# Patient Record
Sex: MALE | Race: WHITE | Employment: OTHER | URBAN - METROPOLITAN AREA
[De-identification: names, ages, dates, MRNs, and addresses within clinical notes are randomized per-mention and may not be internally consistent; named-entity substitution may affect disease eponyms.]

---

## 2017-01-01 ENCOUNTER — HOSPITAL ENCOUNTER (OUTPATIENT)
Dept: INTERVENTIONAL RADIOLOGY/VASCULAR | Facility: HOSPITAL | Age: 82
Discharge: HOME | End: 2017-01-01
Attending: UROLOGY | Admitting: UROLOGY
Payer: MEDICARE

## 2017-01-01 ENCOUNTER — OFFICE VISIT (OUTPATIENT)
Dept: WOUND CARE | Facility: HOSPITAL | Age: 82
End: 2017-01-01
Attending: UROLOGY
Payer: MEDICARE

## 2017-01-01 ENCOUNTER — ANESTHESIA EVENT (OUTPATIENT)
Dept: SURGERY | Facility: HOSPITAL | Age: 82
DRG: 654 | End: 2017-01-01
Payer: MEDICARE

## 2017-01-01 ENCOUNTER — APPOINTMENT (OUTPATIENT)
Dept: LAB | Facility: HOSPITAL | Age: 82
End: 2017-01-01
Payer: MEDICARE

## 2017-01-01 ENCOUNTER — APPOINTMENT (OUTPATIENT)
Dept: GENERAL RADIOLOGY | Facility: HOSPITAL | Age: 82
DRG: 654 | End: 2017-01-01
Attending: UROLOGY
Payer: MEDICARE

## 2017-01-01 ENCOUNTER — TELEPHONE (OUTPATIENT)
Dept: WOUND CARE | Facility: HOSPITAL | Age: 82
End: 2017-01-01

## 2017-01-01 ENCOUNTER — ANESTHESIA (OUTPATIENT)
Dept: SURGERY | Facility: HOSPITAL | Age: 82
DRG: 654 | End: 2017-01-01
Payer: MEDICARE

## 2017-01-01 ENCOUNTER — LABORATORY ENCOUNTER (OUTPATIENT)
Dept: LAB | Facility: HOSPITAL | Age: 82
End: 2017-01-01
Payer: MEDICARE

## 2017-01-01 ENCOUNTER — HOSPITAL ENCOUNTER (INPATIENT)
Facility: HOSPITAL | Age: 82
LOS: 4 days | Discharge: HOME HEALTH | DRG: 654 | End: 2017-01-01
Attending: UROLOGY | Admitting: UROLOGY
Payer: MEDICARE

## 2017-01-01 ENCOUNTER — SURGERY (OUTPATIENT)
Age: 82
End: 2017-01-01

## 2017-01-01 ENCOUNTER — APPOINTMENT (OUTPATIENT)
Dept: CT IMAGING | Facility: HOSPITAL | Age: 82
DRG: 654 | End: 2017-01-01
Attending: HOSPITALIST
Payer: MEDICARE

## 2017-01-01 VITALS
DIASTOLIC BLOOD PRESSURE: 47 MMHG | RESPIRATION RATE: 18 BRPM | BODY MASS INDEX: 22.9 KG/M2 | SYSTOLIC BLOOD PRESSURE: 115 MMHG | WEIGHT: 160 LBS | HEIGHT: 70 IN | OXYGEN SATURATION: 99 % | TEMPERATURE: 98 F | HEART RATE: 56 BPM

## 2017-01-01 VITALS
BODY MASS INDEX: 22.94 KG/M2 | SYSTOLIC BLOOD PRESSURE: 127 MMHG | DIASTOLIC BLOOD PRESSURE: 41 MMHG | RESPIRATION RATE: 15 BRPM | HEART RATE: 72 BPM | OXYGEN SATURATION: 96 % | TEMPERATURE: 98 F | WEIGHT: 160.25 LBS | HEIGHT: 70 IN

## 2017-01-01 DIAGNOSIS — R33.9 URINARY RETENTION: ICD-10-CM

## 2017-01-01 DIAGNOSIS — R31.0 GROSS HEMATURIA: ICD-10-CM

## 2017-01-01 DIAGNOSIS — R00.1 BRADYCARDIA, SEVERE SINUS: ICD-10-CM

## 2017-01-01 DIAGNOSIS — C67.8 MALIGNANT NEOPLASM OF OVERLAPPING SITES OF BLADDER (HCC): ICD-10-CM

## 2017-01-01 DIAGNOSIS — E78.5 HYPERLIPIDEMIA, UNSPECIFIED HYPERLIPIDEMIA TYPE: Primary | ICD-10-CM

## 2017-01-01 DIAGNOSIS — N17.9 AKI (ACUTE KIDNEY INJURY) (HCC): ICD-10-CM

## 2017-01-01 DIAGNOSIS — E78.5 HYPERLIPIDEMIA, UNSPECIFIED HYPERLIPIDEMIA TYPE: ICD-10-CM

## 2017-01-01 DIAGNOSIS — N30.40 RADIATION CYSTITIS: ICD-10-CM

## 2017-01-01 DIAGNOSIS — C67.9 MALIGNANT NEOPLASM OF URINARY BLADDER, UNSPECIFIED SITE (HCC): Primary | ICD-10-CM

## 2017-01-01 DIAGNOSIS — N13.30 BILATERAL HYDRONEPHROSIS: ICD-10-CM

## 2017-01-01 LAB
ANTIBODY SCREEN: NEGATIVE
ATRIAL RATE: 56 BPM
BASOPHILS # BLD AUTO: 0.07 X10(3) UL (ref 0–0.1)
BASOPHILS # BLD AUTO: 0.07 X10(3) UL (ref 0–0.1)
BASOPHILS NFR BLD AUTO: 0.5 %
BASOPHILS NFR BLD AUTO: 0.6 %
BLOOD TYPE BARCODE: 7300
BUN BLD-MCNC: 19 MG/DL (ref 8–20)
BUN BLD-MCNC: 21 MG/DL (ref 8–20)
BUN BLD-MCNC: 23 MG/DL (ref 8–20)
BUN BLD-MCNC: 23 MG/DL (ref 8–20)
BUN BLD-MCNC: 24 MG/DL (ref 8–20)
CALCIUM BLD-MCNC: 8.4 MG/DL (ref 8.3–10.3)
CALCIUM BLD-MCNC: 8.5 MG/DL (ref 8.3–10.3)
CALCIUM BLD-MCNC: 8.5 MG/DL (ref 8.3–10.3)
CALCIUM BLD-MCNC: 9.3 MG/DL (ref 8.3–10.3)
CALCIUM BLD-MCNC: 9.4 MG/DL (ref 8.3–10.3)
CHLORIDE: 101 MMOL/L (ref 101–111)
CHLORIDE: 103 MMOL/L (ref 101–111)
CHLORIDE: 104 MMOL/L (ref 101–111)
CHLORIDE: 111 MMOL/L (ref 101–111)
CHLORIDE: 111 MMOL/L (ref 101–111)
CO2: 19 MMOL/L (ref 22–32)
CO2: 22 MMOL/L (ref 22–32)
CO2: 25 MMOL/L (ref 22–32)
CO2: 25 MMOL/L (ref 22–32)
CO2: 27 MMOL/L (ref 22–32)
CREAT BLD-MCNC: 1.06 MG/DL (ref 0.7–1.3)
CREAT BLD-MCNC: 1.14 MG/DL (ref 0.7–1.3)
CREAT BLD-MCNC: 1.2 MG/DL (ref 0.7–1.3)
CREAT BLD-MCNC: 1.41 MG/DL (ref 0.7–1.3)
CREAT BLD-MCNC: 1.49 MG/DL (ref 0.7–1.3)
CREATININE, OTHER BODY FLUID: 0.95 MG/DL
CREATININE, OTHER BODY FLUID: 1.03 MG/DL
DEPRECATED HBV CORE AB SER IA-ACNC: 242.8 NG/ML (ref 22–322)
DEPRECATED HBV CORE AB SER IA-ACNC: 345.4 NG/ML (ref 22–322)
EOSINOPHIL # BLD AUTO: 0.37 X10(3) UL (ref 0–0.3)
EOSINOPHIL # BLD AUTO: 0.57 X10(3) UL (ref 0–0.3)
EOSINOPHIL NFR BLD AUTO: 3.2 %
EOSINOPHIL NFR BLD AUTO: 3.9 %
ERYTHROCYTE [DISTWIDTH] IN BLOOD BY AUTOMATED COUNT: 12.1 % (ref 11.5–16)
ERYTHROCYTE [DISTWIDTH] IN BLOOD BY AUTOMATED COUNT: 12.4 % (ref 11.5–16)
ERYTHROCYTE [DISTWIDTH] IN BLOOD BY AUTOMATED COUNT: 12.7 % (ref 11.5–16)
ERYTHROCYTE [DISTWIDTH] IN BLOOD BY AUTOMATED COUNT: 12.8 % (ref 11.5–16)
ERYTHROCYTE [DISTWIDTH] IN BLOOD BY AUTOMATED COUNT: 12.8 % (ref 11.5–16)
ERYTHROCYTE [DISTWIDTH] IN BLOOD BY AUTOMATED COUNT: 13 % (ref 11.5–16)
ERYTHROCYTE [DISTWIDTH] IN BLOOD BY AUTOMATED COUNT: 13 % (ref 11.5–16)
GLUCOSE BLD-MCNC: 127 MG/DL (ref 70–99)
GLUCOSE BLD-MCNC: 132 MG/DL (ref 70–99)
GLUCOSE BLD-MCNC: 169 MG/DL (ref 70–99)
GLUCOSE BLD-MCNC: 97 MG/DL (ref 70–99)
GLUCOSE BLD-MCNC: 98 MG/DL (ref 70–99)
HCT VFR BLD AUTO: 31 % (ref 37–53)
HCT VFR BLD AUTO: 31.1 % (ref 37–53)
HCT VFR BLD AUTO: 32 % (ref 37–53)
HCT VFR BLD AUTO: 33.1 % (ref 37–53)
HCT VFR BLD AUTO: 33.1 % (ref 37–53)
HCT VFR BLD AUTO: 35.2 % (ref 37–53)
HCT VFR BLD AUTO: 36.8 % (ref 37–53)
HGB BLD-MCNC: 10.2 G/DL (ref 13–17)
HGB BLD-MCNC: 10.2 G/DL (ref 13–17)
HGB BLD-MCNC: 10.7 G/DL (ref 13–17)
HGB BLD-MCNC: 11.5 G/DL (ref 13–17)
HGB BLD-MCNC: 11.9 G/DL (ref 13–17)
IMMATURE GRANULOCYTE COUNT: 0.07 X10(3) UL (ref 0–1)
IMMATURE GRANULOCYTE COUNT: 0.13 X10(3) UL (ref 0–1)
IMMATURE GRANULOCYTE RATIO %: 0.6 %
IMMATURE GRANULOCYTE RATIO %: 0.9 %
IRON SATURATION: 5 % (ref 13–45)
IRON SATURATION: 8 % (ref 13–45)
IRON: 10 UG/DL (ref 45–182)
IRON: 13 UG/DL (ref 45–182)
LYMPHOCYTES # BLD AUTO: 0.89 X10(3) UL (ref 0.9–4)
LYMPHOCYTES # BLD AUTO: 1.74 X10(3) UL (ref 0.9–4)
LYMPHOCYTES NFR BLD AUTO: 15.2 %
LYMPHOCYTES NFR BLD AUTO: 6.2 %
MCH RBC QN AUTO: 29.2 PG (ref 27–33.2)
MCH RBC QN AUTO: 29.2 PG (ref 27–33.2)
MCH RBC QN AUTO: 29.3 PG (ref 27–33.2)
MCH RBC QN AUTO: 29.4 PG (ref 27–33.2)
MCH RBC QN AUTO: 29.5 PG (ref 27–33.2)
MCH RBC QN AUTO: 29.5 PG (ref 27–33.2)
MCH RBC QN AUTO: 29.7 PG (ref 27–33.2)
MCHC RBC AUTO-ENTMCNC: 32.3 G/DL (ref 31–37)
MCHC RBC AUTO-ENTMCNC: 32.7 G/DL (ref 31–37)
MCHC RBC AUTO-ENTMCNC: 32.8 G/DL (ref 31–37)
MCHC RBC AUTO-ENTMCNC: 32.9 G/DL (ref 31–37)
MCHC RBC AUTO-ENTMCNC: 33.4 G/DL (ref 31–37)
MCV RBC AUTO: 88.9 FL (ref 80–99)
MCV RBC AUTO: 89.6 FL (ref 80–99)
MCV RBC AUTO: 90.2 FL (ref 80–99)
MCV RBC AUTO: 90.2 FL (ref 80–99)
MCV RBC AUTO: 91.3 FL (ref 80–99)
MONOCYTES # BLD AUTO: 0.54 X10(3) UL (ref 0.1–0.6)
MONOCYTES # BLD AUTO: 0.81 X10(3) UL (ref 0.1–0.6)
MONOCYTES NFR BLD AUTO: 3.7 %
MONOCYTES NFR BLD AUTO: 7.1 %
NEUTROPHIL ABS PRELIM: 12.27 X10 (3) UL (ref 1.3–6.7)
NEUTROPHIL ABS PRELIM: 8.35 X10 (3) UL (ref 1.3–6.7)
NEUTROPHILS # BLD AUTO: 12.27 X10(3) UL (ref 1.3–6.7)
NEUTROPHILS # BLD AUTO: 8.35 X10(3) UL (ref 1.3–6.7)
NEUTROPHILS NFR BLD AUTO: 73.3 %
NEUTROPHILS NFR BLD AUTO: 84.8 %
P AXIS: 59 DEGREES
P-R INTERVAL: 204 MS
PLATELET # BLD AUTO: 256 10(3)UL (ref 150–450)
PLATELET # BLD AUTO: 263 10(3)UL (ref 150–450)
PLATELET # BLD AUTO: 287 10(3)UL (ref 150–450)
PLATELET # BLD AUTO: 290 10(3)UL (ref 150–450)
PLATELET # BLD AUTO: 290 10(3)UL (ref 150–450)
PLATELET # BLD AUTO: 313 10(3)UL (ref 150–450)
PLATELET # BLD AUTO: 322 10(3)UL (ref 150–450)
POTASSIUM SERPL-SCNC: 4.2 MMOL/L (ref 3.6–5.1)
POTASSIUM SERPL-SCNC: 4.5 MMOL/L (ref 3.6–5.1)
POTASSIUM SERPL-SCNC: 4.6 MMOL/L (ref 3.6–5.1)
POTASSIUM SERPL-SCNC: 4.6 MMOL/L (ref 3.6–5.1)
POTASSIUM SERPL-SCNC: 4.9 MMOL/L (ref 3.6–5.1)
Q-T INTERVAL: 406 MS
QRS DURATION: 96 MS
QTC CALCULATION (BEZET): 391 MS
R AXIS: -33 DEGREES
RBC # BLD AUTO: 3.46 X10(6)UL (ref 3.8–5.8)
RBC # BLD AUTO: 3.47 X10(6)UL (ref 3.8–5.8)
RBC # BLD AUTO: 3.6 X10(6)UL (ref 3.8–5.8)
RBC # BLD AUTO: 3.67 X10(6)UL (ref 3.8–5.8)
RBC # BLD AUTO: 3.67 X10(6)UL (ref 3.8–5.8)
RBC # BLD AUTO: 3.93 X10(6)UL (ref 3.8–5.8)
RBC # BLD AUTO: 4.03 X10(6)UL (ref 3.8–5.8)
RED CELL DISTRIBUTION WIDTH-SD: 39.7 FL (ref 35.1–46.3)
RED CELL DISTRIBUTION WIDTH-SD: 41.1 FL (ref 35.1–46.3)
RED CELL DISTRIBUTION WIDTH-SD: 41.3 FL (ref 35.1–46.3)
RED CELL DISTRIBUTION WIDTH-SD: 42.2 FL (ref 35.1–46.3)
RED CELL DISTRIBUTION WIDTH-SD: 42.2 FL (ref 35.1–46.3)
RED CELL DISTRIBUTION WIDTH-SD: 42.4 FL (ref 35.1–46.3)
RED CELL DISTRIBUTION WIDTH-SD: 42.5 FL (ref 35.1–46.3)
RH BLOOD TYPE: POSITIVE
SODIUM SERPL-SCNC: 135 MMOL/L (ref 136–144)
SODIUM SERPL-SCNC: 138 MMOL/L (ref 136–144)
SODIUM SERPL-SCNC: 138 MMOL/L (ref 136–144)
SODIUM SERPL-SCNC: 139 MMOL/L (ref 136–144)
SODIUM SERPL-SCNC: 140 MMOL/L (ref 136–144)
T AXIS: 46 DEGREES
TOTAL IRON BINDING CAPACITY: 162 UG/DL (ref 298–536)
TOTAL IRON BINDING CAPACITY: 212 UG/DL (ref 298–536)
TRANSFERRIN: 109 MG/DL (ref 200–360)
TRANSFERRIN: 142 MG/DL (ref 200–360)
VENTRICULAR RATE: 56 BPM
WBC # BLD AUTO: 11.4 X10(3) UL (ref 4–13)
WBC # BLD AUTO: 12.5 X10(3) UL (ref 4–13)
WBC # BLD AUTO: 13.1 X10(3) UL (ref 4–13)
WBC # BLD AUTO: 14.5 X10(3) UL (ref 4–13)
WBC # BLD AUTO: 14.5 X10(3) UL (ref 4–13)
WBC # BLD AUTO: 16.3 X10(3) UL (ref 4–13)
WBC # BLD AUTO: 18.1 X10(3) UL (ref 4–13)

## 2017-01-01 PROCEDURE — 97530 THERAPEUTIC ACTIVITIES: CPT

## 2017-01-01 PROCEDURE — 97161 PT EVAL LOW COMPLEX 20 MIN: CPT

## 2017-01-01 PROCEDURE — 85025 COMPLETE CBC W/AUTO DIFF WBC: CPT

## 2017-01-01 PROCEDURE — 80048 BASIC METABOLIC PNL TOTAL CA: CPT | Performed by: UROLOGY

## 2017-01-01 PROCEDURE — 87086 URINE CULTURE/COLONY COUNT: CPT | Performed by: EMERGENCY MEDICINE

## 2017-01-01 PROCEDURE — 82728 ASSAY OF FERRITIN: CPT | Performed by: UROLOGY

## 2017-01-01 PROCEDURE — 99215 OFFICE O/P EST HI 40 MIN: CPT

## 2017-01-01 PROCEDURE — 0T184ZC BYPASS BILATERAL URETERS TO ILEOCUTANEOUS, PERCUTANEOUS ENDOSCOPIC APPROACH: ICD-10-PCS | Performed by: UROLOGY

## 2017-01-01 PROCEDURE — 87086 URINE CULTURE/COLONY COUNT: CPT | Performed by: UROLOGY

## 2017-01-01 PROCEDURE — 99213 OFFICE O/P EST LOW 20 MIN: CPT

## 2017-01-01 PROCEDURE — 82728 ASSAY OF FERRITIN: CPT | Performed by: HOSPITALIST

## 2017-01-01 PROCEDURE — 0T9130Z DRAINAGE OF LEFT KIDNEY WITH DRAINAGE DEVICE, PERCUTANEOUS APPROACH: ICD-10-PCS | Performed by: RADIOLOGY

## 2017-01-01 PROCEDURE — 88309 TISSUE EXAM BY PATHOLOGIST: CPT | Performed by: UROLOGY

## 2017-01-01 PROCEDURE — 88307 TISSUE EXAM BY PATHOLOGIST: CPT | Performed by: UROLOGY

## 2017-01-01 PROCEDURE — 86850 RBC ANTIBODY SCREEN: CPT | Performed by: UROLOGY

## 2017-01-01 PROCEDURE — 85027 COMPLETE CBC AUTOMATED: CPT | Performed by: UROLOGY

## 2017-01-01 PROCEDURE — 87077 CULTURE AEROBIC IDENTIFY: CPT | Performed by: UROLOGY

## 2017-01-01 PROCEDURE — 88305 TISSUE EXAM BY PATHOLOGIST: CPT | Performed by: UROLOGY

## 2017-01-01 PROCEDURE — 87186 SC STD MICRODIL/AGAR DIL: CPT | Performed by: UROLOGY

## 2017-01-01 PROCEDURE — 87186 SC STD MICRODIL/AGAR DIL: CPT | Performed by: EMERGENCY MEDICINE

## 2017-01-01 PROCEDURE — 86901 BLOOD TYPING SEROLOGIC RH(D): CPT | Performed by: UROLOGY

## 2017-01-01 PROCEDURE — 83540 ASSAY OF IRON: CPT | Performed by: HOSPITALIST

## 2017-01-01 PROCEDURE — 8E0W4CZ ROBOTIC ASSISTED PROCEDURE OF TRUNK REGION, PERCUTANEOUS ENDOSCOPIC APPROACH: ICD-10-PCS | Performed by: UROLOGY

## 2017-01-01 PROCEDURE — 87077 CULTURE AEROBIC IDENTIFY: CPT | Performed by: EMERGENCY MEDICINE

## 2017-01-01 PROCEDURE — 88342 IMHCHEM/IMCYTCHM 1ST ANTB: CPT | Performed by: UROLOGY

## 2017-01-01 PROCEDURE — 93005 ELECTROCARDIOGRAM TRACING: CPT

## 2017-01-01 PROCEDURE — 07BC4ZZ EXCISION OF PELVIS LYMPHATIC, PERCUTANEOUS ENDOSCOPIC APPROACH: ICD-10-PCS | Performed by: UROLOGY

## 2017-01-01 PROCEDURE — 74000 XR ABDOMEN (1 VIEW) (CPT=74000): CPT | Performed by: UROLOGY

## 2017-01-01 PROCEDURE — 36415 COLL VENOUS BLD VENIPUNCTURE: CPT

## 2017-01-01 PROCEDURE — 50432 PLMT NEPHROSTOMY CATHETER: CPT

## 2017-01-01 PROCEDURE — 80048 BASIC METABOLIC PNL TOTAL CA: CPT

## 2017-01-01 PROCEDURE — 0TTB4ZZ RESECTION OF BLADDER, PERCUTANEOUS ENDOSCOPIC APPROACH: ICD-10-PCS | Performed by: UROLOGY

## 2017-01-01 PROCEDURE — 82570 ASSAY OF URINE CREATININE: CPT | Performed by: UROLOGY

## 2017-01-01 PROCEDURE — 83550 IRON BINDING TEST: CPT | Performed by: HOSPITALIST

## 2017-01-01 PROCEDURE — 0VB04ZZ EXCISION OF PROSTATE, PERCUTANEOUS ENDOSCOPIC APPROACH: ICD-10-PCS | Performed by: UROLOGY

## 2017-01-01 PROCEDURE — 83550 IRON BINDING TEST: CPT | Performed by: UROLOGY

## 2017-01-01 PROCEDURE — 99152 MOD SED SAME PHYS/QHP 5/>YRS: CPT

## 2017-01-01 PROCEDURE — 97116 GAIT TRAINING THERAPY: CPT

## 2017-01-01 PROCEDURE — 81001 URINALYSIS AUTO W/SCOPE: CPT | Performed by: UROLOGY

## 2017-01-01 PROCEDURE — 83540 ASSAY OF IRON: CPT | Performed by: UROLOGY

## 2017-01-01 PROCEDURE — 88341 IMHCHEM/IMCYTCHM EA ADD ANTB: CPT | Performed by: UROLOGY

## 2017-01-01 PROCEDURE — 93010 ELECTROCARDIOGRAM REPORT: CPT | Performed by: INTERNAL MEDICINE

## 2017-01-01 PROCEDURE — 86900 BLOOD TYPING SEROLOGIC ABO: CPT | Performed by: UROLOGY

## 2017-01-01 PROCEDURE — 85025 COMPLETE CBC W/AUTO DIFF WBC: CPT | Performed by: UROLOGY

## 2017-01-01 PROCEDURE — 70450 CT HEAD/BRAIN W/O DYE: CPT | Performed by: HOSPITALIST

## 2017-01-01 PROCEDURE — 0T9030Z DRAINAGE OF RIGHT KIDNEY WITH DRAINAGE DEVICE, PERCUTANEOUS APPROACH: ICD-10-PCS | Performed by: RADIOLOGY

## 2017-01-01 PROCEDURE — 86920 COMPATIBILITY TEST SPIN: CPT

## 2017-01-01 DEVICE — URINARY DIVERSN STENT: Type: IMPLANTABLE DEVICE | Site: URETER | Status: FUNCTIONAL

## 2017-01-01 RX ORDER — LIDOCAINE HYDROCHLORIDE 10 MG/ML
INJECTION, SOLUTION INFILTRATION; PERINEURAL
Status: COMPLETED
Start: 2017-01-01 | End: 2017-01-01

## 2017-01-01 RX ORDER — MULTIVITAMIN WITH FOLIC ACID 400 MCG
1 TABLET ORAL DAILY
COMMUNITY
End: 2017-01-01

## 2017-01-01 RX ORDER — FAMOTIDINE 20 MG/1
20 TABLET ORAL DAILY
Status: DISCONTINUED | OUTPATIENT
Start: 2017-01-01 | End: 2017-01-01

## 2017-01-01 RX ORDER — ATORVASTATIN CALCIUM 20 MG/1
20 TABLET, FILM COATED ORAL NIGHTLY
Status: DISCONTINUED | OUTPATIENT
Start: 2017-01-01 | End: 2017-01-01

## 2017-01-01 RX ORDER — CEFOXITIN 2 G/1
2 INJECTION, POWDER, FOR SOLUTION INTRAVENOUS ONCE
Status: DISCONTINUED | OUTPATIENT
Start: 2017-01-01 | End: 2017-01-01 | Stop reason: HOSPADM

## 2017-01-01 RX ORDER — SODIUM CHLORIDE, SODIUM LACTATE, POTASSIUM CHLORIDE, CALCIUM CHLORIDE 600; 310; 30; 20 MG/100ML; MG/100ML; MG/100ML; MG/100ML
INJECTION, SOLUTION INTRAVENOUS CONTINUOUS
Status: DISCONTINUED | OUTPATIENT
Start: 2017-01-01 | End: 2017-01-01

## 2017-01-01 RX ORDER — NALOXONE HYDROCHLORIDE 0.4 MG/ML
80 INJECTION, SOLUTION INTRAMUSCULAR; INTRAVENOUS; SUBCUTANEOUS AS NEEDED
Status: DISCONTINUED | OUTPATIENT
Start: 2017-01-01 | End: 2017-01-01 | Stop reason: HOSPADM

## 2017-01-01 RX ORDER — ACETAMINOPHEN 500 MG
500 TABLET ORAL EVERY 6 HOURS PRN
COMMUNITY
End: 2017-01-01 | Stop reason: ALTCHOICE

## 2017-01-01 RX ORDER — BISACODYL 10 MG
10 SUPPOSITORY, RECTAL RECTAL
Status: DISCONTINUED | OUTPATIENT
Start: 2017-01-01 | End: 2017-01-01

## 2017-01-01 RX ORDER — ACETAMINOPHEN 500 MG
1000 TABLET ORAL EVERY 6 HOURS
Status: DISPENSED | OUTPATIENT
Start: 2017-01-01 | End: 2017-01-01

## 2017-01-01 RX ORDER — LEVOFLOXACIN 5 MG/ML
INJECTION, SOLUTION INTRAVENOUS
Status: COMPLETED
Start: 2017-01-01 | End: 2017-01-01

## 2017-01-01 RX ORDER — FAMOTIDINE 20 MG/1
20 TABLET ORAL 2 TIMES DAILY
Status: DISCONTINUED | OUTPATIENT
Start: 2017-01-01 | End: 2017-01-01

## 2017-01-01 RX ORDER — HYDROCODONE BITARTRATE AND ACETAMINOPHEN 5; 325 MG/1; MG/1
2 TABLET ORAL AS NEEDED
Status: DISCONTINUED | OUTPATIENT
Start: 2017-01-01 | End: 2017-01-01 | Stop reason: HOSPADM

## 2017-01-01 RX ORDER — ENOXAPARIN SODIUM 100 MG/ML
40 INJECTION SUBCUTANEOUS NIGHTLY
Status: DISCONTINUED | OUTPATIENT
Start: 2017-01-01 | End: 2017-01-01

## 2017-01-01 RX ORDER — TRAMADOL HYDROCHLORIDE 50 MG/1
50 TABLET ORAL EVERY 6 HOURS PRN
Status: DISCONTINUED | OUTPATIENT
Start: 2017-01-01 | End: 2017-01-01

## 2017-01-01 RX ORDER — ENOXAPARIN SODIUM 100 MG/ML
40 INJECTION SUBCUTANEOUS DAILY
Qty: 5.6 ML | Refills: 0 | Status: SHIPPED | COMMUNITY
Start: 2017-01-01 | End: 2017-01-01

## 2017-01-01 RX ORDER — HYDROCODONE BITARTRATE AND ACETAMINOPHEN 5; 325 MG/1; MG/1
1 TABLET ORAL AS NEEDED
Status: DISCONTINUED | OUTPATIENT
Start: 2017-01-01 | End: 2017-01-01 | Stop reason: HOSPADM

## 2017-01-01 RX ORDER — ONDANSETRON 2 MG/ML
4 INJECTION INTRAMUSCULAR; INTRAVENOUS EVERY 4 HOURS PRN
Status: DISCONTINUED | OUTPATIENT
Start: 2017-01-01 | End: 2017-01-01

## 2017-01-01 RX ORDER — CEFOXITIN 2 G/1
INJECTION, POWDER, FOR SOLUTION INTRAVENOUS
Status: DISCONTINUED | OUTPATIENT
Start: 2017-01-01 | End: 2017-01-01 | Stop reason: HOSPADM

## 2017-01-01 RX ORDER — HYDROMORPHONE HYDROCHLORIDE 1 MG/ML
0.2 INJECTION, SOLUTION INTRAMUSCULAR; INTRAVENOUS; SUBCUTANEOUS EVERY 2 HOUR PRN
Status: DISCONTINUED | OUTPATIENT
Start: 2017-01-01 | End: 2017-01-01

## 2017-01-01 RX ORDER — TRAMADOL HYDROCHLORIDE 50 MG/1
50 TABLET ORAL EVERY 6 HOURS PRN
Qty: 20 TABLET | Refills: 0 | Status: SHIPPED | OUTPATIENT
Start: 2017-01-01 | End: 2017-01-01

## 2017-01-01 RX ORDER — SODIUM CHLORIDE 9 MG/ML
INJECTION, SOLUTION INTRAVENOUS CONTINUOUS
Status: DISCONTINUED | OUTPATIENT
Start: 2017-01-01 | End: 2017-01-01

## 2017-01-01 RX ORDER — MIDAZOLAM HYDROCHLORIDE 1 MG/ML
INJECTION INTRAMUSCULAR; INTRAVENOUS
Status: COMPLETED
Start: 2017-01-01 | End: 2017-01-01

## 2017-01-01 RX ORDER — HYDROMORPHONE HYDROCHLORIDE 1 MG/ML
0.4 INJECTION, SOLUTION INTRAMUSCULAR; INTRAVENOUS; SUBCUTANEOUS EVERY 5 MIN PRN
Status: DISCONTINUED | OUTPATIENT
Start: 2017-01-01 | End: 2017-01-01 | Stop reason: HOSPADM

## 2017-01-01 RX ORDER — HALOPERIDOL 5 MG/ML
1 INJECTION INTRAMUSCULAR EVERY 6 HOURS PRN
Status: DISCONTINUED | OUTPATIENT
Start: 2017-01-01 | End: 2017-01-01

## 2017-01-01 RX ORDER — PSEUDOEPHEDRINE HCL 30 MG
100 TABLET ORAL 2 TIMES DAILY PRN
Qty: 30 CAPSULE | Refills: 0 | Status: SHIPPED | OUTPATIENT
Start: 2017-01-01 | End: 2017-01-01

## 2017-01-01 RX ORDER — BUPIVACAINE HYDROCHLORIDE 5 MG/ML
INJECTION, SOLUTION PERINEURAL AS NEEDED
Status: DISCONTINUED | OUTPATIENT
Start: 2017-01-01 | End: 2017-01-01 | Stop reason: HOSPADM

## 2017-01-01 RX ORDER — DOCUSATE SODIUM 100 MG/1
100 CAPSULE, LIQUID FILLED ORAL 2 TIMES DAILY
Status: DISCONTINUED | OUTPATIENT
Start: 2017-01-01 | End: 2017-01-01

## 2017-01-01 RX ORDER — MEPERIDINE HYDROCHLORIDE 25 MG/ML
12.5 INJECTION INTRAMUSCULAR; INTRAVENOUS; SUBCUTANEOUS AS NEEDED
Status: DISCONTINUED | OUTPATIENT
Start: 2017-01-01 | End: 2017-01-01 | Stop reason: HOSPADM

## 2017-01-01 RX ORDER — HEPARIN SODIUM 5000 [USP'U]/ML
5000 INJECTION, SOLUTION INTRAVENOUS; SUBCUTANEOUS ONCE
Status: COMPLETED | OUTPATIENT
Start: 2017-01-01 | End: 2017-01-01

## 2017-01-01 RX ADMIN — SODIUM CHLORIDE: 9 INJECTION, SOLUTION INTRAVENOUS at 07:15:00

## 2017-02-22 PROBLEM — Z85.828 HISTORY OF SCC (SQUAMOUS CELL CARCINOMA) OF SKIN: Status: ACTIVE | Noted: 2017-01-01

## 2017-02-22 PROBLEM — C61 PROSTATE CANCER (HCC): Status: ACTIVE | Noted: 2017-01-01

## 2017-02-22 PROBLEM — R91.8 PULMONARY NODULES: Status: ACTIVE | Noted: 2017-01-01

## 2017-02-22 PROBLEM — M47.816 ARTHRITIS, LUMBAR SPINE: Status: ACTIVE | Noted: 2017-01-01

## 2017-04-26 PROBLEM — N13.9 OBSTRUCTION, UROPATHY: Status: ACTIVE | Noted: 2017-01-01

## 2017-04-26 PROBLEM — Z87.448 HISTORY OF ACUTE RENAL FAILURE: Status: ACTIVE | Noted: 2017-01-01

## 2017-05-09 PROBLEM — C67.9 MALIGNANT NEOPLASM OF URINARY BLADDER, UNSPECIFIED SITE (HCC): Status: ACTIVE | Noted: 2017-01-01

## 2017-05-25 PROBLEM — N18.30 STAGE 3 CHRONIC KIDNEY DISEASE (HCC): Status: ACTIVE | Noted: 2017-01-01

## 2017-07-25 PROBLEM — N30.40 RADIATION CYSTITIS: Status: ACTIVE | Noted: 2017-01-01

## 2017-07-28 NOTE — PROGRESS NOTES
Rc'd pt from IR in stable condition. Bilateral neph tubes intact. Right draining clear yellow urine, left draining bloody urine. Pt denies c/o pain or discomfort. After 2hrs bedrest, reviewed all instructions, pt and family verbalized understanding.  All qu

## 2017-07-28 NOTE — PROCEDURES
[facility]  Procedure Note    Wanda Rojo.  Patient Status:  Outpatient in a Bed    1929 MRN [id number]   Location [address] [facility] Attending Akin Fischer MD   Hosp Day # 0 PCP Susi Kraus MD     Procedure: Talha Davis

## 2017-07-28 NOTE — H&P
[address]  Patient Status:  Outpatient in a Bed    1929 MRN [id number]   Location [address] [facility] Attending Luly Sousa MD   Hosp Day # 0 PCP Magui Keating MD     Admitting D SURGICAL HISTORY      Comment: Cysto, TURBT- Dr. Sallie Puente  11/12/15: OTHER SURGICAL HISTORY      Comment: Cysto, Bladder Bx- Dr. Sallie Puente   4/19/16: OTHER SURGICAL HISTORY      Comment: BTS Cystoscopy- Dr. Sallie Puente   8/2/16: OTHER SURGICAL HISTORY      Commen respirations  Neuro: AOx3    Results:   Labs:  Recent Labs   Lab  07/25/17   1407   RBC  3.98   HGB  11.6*   HCT  36.1*   MCV  90.7   MCH  29.1   MCHC  32.1   RDW  12.7   WBC  13.23*   PLT  285     Recent Labs   Lab  07/25/17   1407   INR  1.0   PTT  32.9

## 2017-08-11 NOTE — PROGRESS NOTES
[facility]  Report of Pre-op Ostomy Nurse Consultation    Zachary Whyte.  Patient Status:  Wound Series    1929 MRN [id number]   Location [address] [place] [address] Attending Jerri Vega MD     History of Present Illness:  THE [facility] HISTORY      Comment: seeds    3/17/15: OTHER SURGICAL HISTORY      Comment: BTS Cysto- Dr. Francine Rouse   3/23/15: OTHER SURGICAL HISTORY      Comment: Cysto, TURBT- Dr. Francine Rouse  11/12/15: OTHER SURGICAL HISTORY      Comment: Cysto, Bladder Bx- Dr. Francine Rouse   4 order, pt was instructed that these markings are guidelines but surgeon will choose final location during surgery. Abdomen examined with patient laying, sitting and standing. Stoma sites marked to RUQ and RLQ    These sites were in 1. Rectus muscle, 2.  In

## 2017-08-14 NOTE — PROGRESS NOTES
NURSING ADMISSION NOTE      Patient admitted via Cart  Oriented to room. Safety precautions initiated. Bed in low position. Call light in reach. A/o x 4. Drowsy but awakens easily to voice.   Reports mild pain, family was concerned he was grimacing

## 2017-08-14 NOTE — PROGRESS NOTES
Bandage on right lower back. Dry and intact. Ilio conduit drainage small amount of pink fluid. Abdomen flat with a small puffy area medially to drain site. Penrose drain intact draining small amount red fluid.

## 2017-08-14 NOTE — H&P
H&P reviewed by Dr. Vilma Barraza  The above referenced H&P was reviewed by Shelton Petersen MD on 8/14/2017, the patient was examined and no significant changes have occurred in the patient's condition since the H&P was performed.   Risks and benefits were discussed,

## 2017-08-14 NOTE — ANESTHESIA PREPROCEDURE EVALUATION
PRE-OP EVALUATION      Patient Name: Briseida Cruz.    Pre-op Diagnosis: Retention of urine, unspecified [R33.9]     Procedure(s):  CYSTOSCOPY    Surgeon(s) and Role:     Khadijah Jacobo MD - Primary    Pre-op vitals reviewed.        Current medic 4/19/16: OTHER SURGICAL HISTORY      Comment: BTS Cystoscopy- Dr. Luana Chaudhary   8/2/16: OTHER SURGICAL HISTORY      Comment: BTS Cystoscopy- Dr. Luana Chaudhary   12/6/16: OTHER SURGICAL HISTORY      Comment: BTS Cystoscopy- Dr. Luana Chaudhary   3/21/17: OTHER SURGICAL HIS 27.0 07/31/2017   BUN 21 (H) 07/31/2017   BUN 21 (H) 07/25/2017   CREATSERUM 1.41 (H) 07/31/2017   CREATSERUM 1.69 (H) 07/25/2017   GLU 97 07/31/2017   CA 9.3 07/31/2017       Lab Results  Component Value Date   INR 1.0 07/25/2017           Airway      Mal

## 2017-08-14 NOTE — OPERATIVE REPORT
Operative Note    Patient Name: Maya Dang.     Preoperative Diagnosis: BLADDER CANCER    Postoperative Diagnosis: same    Primary Surgeon: Nayeli Lennon    Assistant: Antonette Morales CSA    Procedures: Cystoscopy, Robotic Radical Cystoprostatectomy,

## 2017-08-15 NOTE — PLAN OF CARE
Minimal or absence of nausea and vomiting Progressing      Maintains or returns to baseline bowel function Progressing      Absence of urinary retention Progressing      Verbalizes/displays adequate comfort level or patient's stated pain goal Progressing

## 2017-08-15 NOTE — PAYOR COMM NOTE
--------------  ADMISSION REVIEW     Payor: BCBS MEDICARE ADV PPO  Subscriber #:  [id number]  Authorization Number: [id number]    Admit date: N/A  Admit time: N/A       Admitting Physician: Cliff Hare MD  Attending Physician:  Cliff Hare MD  Primary 8/14/2017 1607 Given 0.2 mg Intravenous Susi Khanna RN      0.9%  NaCl infusion     Date Action Dose Route User    8/15/2017 0800 New Bag (none) Intravenous Jaylon Montes RN    8/14/2017 2114 New Bag (none) Intravenous Rosanna Ng RN      s

## 2017-08-15 NOTE — HOME CARE LIAISON
[facility] NOT ABLE TO ACCEPT [facility].  NOT CONTRACTED WITH [facility] INSURANCE    THANKS  Aileen Maldonado

## 2017-08-15 NOTE — PHYSICAL THERAPY NOTE
PHYSICAL THERAPY EVALUATION - INPATIENT     Room Number: 268/981-N  Evaluation Date: 8/15/2017  Type of Evaluation: Initial  Physician Order: PT Eval and Treat    Presenting Problem: Bladder CA s/p procedure 8/14/17 and HL  Reason for Therapy: Mobility COLONOSCOPY      Comment: NoRMAL  2015: COLONOSCOPY  3/23/2015: CYSTOURETHROSCOPY,FULGUR 2-5CM DANIE N/A      Comment: Procedure: CYSTOSCOPY TRANSURETHRAL RESECTION                OF BLADDER TUMOR;  Surgeon: Dereje Escalante MD;  Location: [facility]; pt amb sans AD    SUBJECTIVE  \"Sometimes I see things\"    Patient self-stated goal is get stronger and go home    OBJECTIVE  Precautions: Drain(s)  Fall Risk: Standard fall risk    WEIGHT BEARING RESTRICTION  Weight Bearing Restriction: None 20.91%   Standardized Score (AM-PAC Scale): 53.28   CMS Modifier (G-Code): CJ    FUNCTIONAL ABILITY STATUS  Gait Assessment   Gait Assistance: Supervision  Distance (ft): 200  Assistive Device: None  Pattern: Comment (slow deliberate pattern, NBOS)  Stoop/ mechanics; Endurance; Patient education; Family education;Gait training;Strengthening;Stair training;Transfer training;Balance training  Rehab Potential : Good  Frequency (Obs): 3x/week  Number of Visits to Meet Established Goals: 3      CURRENT GOALS    Goal

## 2017-08-15 NOTE — CONSULTS
.  Reason for consult: periop mgmt    Consulted by: Dr. Cristina Green    PCP: Beth Hoyt MD      History of Present Illness: Patient is a 80year old male with PMH sig for bladder cancer, CKD, pulm fibrosis who presented for cysto with radical cystoprostatecto SURGICAL HISTORY      Comment: seeds    3/17/15: OTHER SURGICAL HISTORY      Comment: BTS Cysto- Dr. Haines Book   3/23/15: OTHER SURGICAL HISTORY      Comment: Cysto, TURBT- Dr. Haines Book  11/12/15: OTHER SURGICAL HISTORY      Comment: Cysto, Bladder Bx- Dr. Mari Winn Disp: 1 tablet Rfl: 0       Scheduled Medication:  • atorvastatin  20 mg Oral Nightly   • enoxaparin  40 mg Subcutaneous Nightly   • famoTIDine  20 mg Oral BID   • docusate sodium  100 mg Oral BID   • acetaminophen  1,000 mg Oral Q6H     Continuous Infusin cyanosis or edema. Skin: Skin color, texture, turgor normal. No rashes or lesions.     Neurologic: Normal strength, no focal deficit appreciated       Diagnostics:   CBC/Chem  Recent Labs   Lab  08/14/17   1430  08/15/17   0552   WBC  16.3*  13.1*   HGB

## 2017-08-15 NOTE — CM/SW NOTE
MSW spoke to Pt and his wife. Both agreeable patient needs HHC. No preference in agency, referral sent to [facility].

## 2017-08-15 NOTE — CM/SW NOTE
[facility] can not take pt, ref sent to [facility]- out of network. [facility] is out of network. Referral sent to [facility]. - accepted by [facility]

## 2017-08-15 NOTE — CM/SW NOTE
08/15/17 0800   CM/SW Referral Data   Referral Source Social Work (self-referral)   Reason for Referral Discharge planning   Informant Patient   Pertinent Medical Hx   Primary Care Physician Name [facility]   Patient Info   Patient's Mental Status Alert;Aureliano

## 2017-08-15 NOTE — PROGRESS NOTES
[facility]  Urology Progress Note    Monica Guo.  Patient Status:  Outpatient in a Bed    1929 MRN [id number]   [facility] 3NW-A Attending Hernandez Hess MD   Hosp Day # 0 PCP Wendall Bumpers, MD     Subjective:  Catana Leaver 10/hr  Clear liquids  Pain management  Continue to monitor UOP  Labs in AM    Dr. Catrina Martinez to see patient later today    Amy Banuelos P.A.-C  [facility] Urology  8/15/2017  9:09 AM    I saw and evaluated the patient with the PA on 8/15/17, and I reviewed and

## 2017-08-15 NOTE — PLAN OF CARE
PAIN - ADULT    • Verbalizes/displays adequate comfort level or patient's stated pain goal Progressing          GASTROINTESTINAL - ADULT    • Minimal or absence of nausea and vomiting Progressing          GENITOURINARY - ADULT    • Absence of urinary reten

## 2017-08-16 PROBLEM — C67.9 BLADDER CANCER (HCC): Status: ACTIVE | Noted: 2017-01-01

## 2017-08-16 NOTE — PROGRESS NOTES
[facility]    Progress Note    Ayanna Mars.  Patient Status:  Inpatient    1929 MRN [id number]   [facility] 3NW-A Attending La De La Garza MD   [facility] Day # 2 PCP Eleuterio Danielle MD       Subjective:   Ayanna Mars. is INR 1.0 07/25/2017   TSH 5.771 (H) 02/27/2017   PSA 0.17 02/27/2017       Xr Abdomen (1 View) (cpt=74000)    Result Date: 8/16/2017  CONCLUSION:  Gaseous distention of small bowel in the left upper quadrant and central abdomen.  Differential includes ileu

## 2017-08-16 NOTE — PROGRESS NOTES
Care of patient assumed by writing RN at 0400. Patient asleep in bed when care assumed. Later upon assessment patient alert and oriented to person and place occasionally. Confused and forgetful, needing re-orientation.  IV haldol given prn my prior RN, see

## 2017-08-16 NOTE — CM/SW NOTE
10:16am  MSW spoke to Pt wife over the phone. She will be in today, MSW will met with her then to discuss caregiver help, JUAN. Currently patient is set up with [facility] [address] at d/c.

## 2017-08-16 NOTE — CONSULTS
[facility]  Report of Inpatient Ostomy Consultation    Asim Byers.  Patient Status:  Inpatient    1929 MRN [id number]   [facility] 3NW-A Attending Alejo Esposito MD     History of Present Illness:  Asim Byers. is OTHER SURGICAL HISTORY      Comment: BTS Cysto- Dr. Francine Rouse   3/23/15: OTHER SURGICAL HISTORY      Comment: Cysto, TURBT- Dr. Francine Rouse  11/12/15: OTHER SURGICAL HISTORY      Comment: Cysto, Bladder Bx- Dr. Francine Rouse   4/19/16: OTHER SURGICAL HISTORY      Comm location: RLQ  Stoma type: ileal conduit  Stoma color: red  Stoma condition: normal with stents in place  Stoma diameter:   Ostomy output: urine blood tinged  Stoma height: adequate  Peristomal skin: intact  Pouching system two piece  Able to care for stom

## 2017-08-16 NOTE — PROGRESS NOTES
WRITER OF NOTE NOTIFIED DR Axel Sanford, DR VILLANUEVA ON CALL. INFORMED DR ABOUT PT'S CHANGE OF STATUS. PT CONFUSED, ANXIOUS, AND  UNABLE TO REDIRECT. PULLING AT IV LINE, ANGELITO AND IRVING BAG. GETTING OUT OF BED WITH OUT ASSISTANCE REPEATLY.  NEW ORDERS RECEIVED, WILL

## 2017-08-16 NOTE — PROGRESS NOTES
[facility] Hospitalist Progress Note     PCP: Walker Fernandez MD    SUBJECTIVE:  No CP, SOB, or N/V. Pt had suppository followed by small bm. He states he's still having waves of abdominal pain.     OBJECTIVE:  Temp:  [97.5 °F (36.4 °C)-98.3 °F (36.8 °C)] 97.8 **OR** HYDROmorphone HCl PF **OR** HYDROmorphone HCl PF, TraMADol HCl       Assessment/Plan:     Active Problems:    Hyperlipidemia       # s/p cystoprostatectomy with b/l pelvic lnd, pelvic mass excision and ileal conduit diversion  · Per urology  · SCDs,

## 2017-08-16 NOTE — CONSULTS
[facility]  Report of Inpatient Ostomy Consultation    Shira Mcnulty.  Patient Status:  Inpatient    1929 MRN [id number]   [facility] 3NW-A Attending Izabela Smart MD     History of Present Illness:  Shira Mcnulty. is Comment: seeds    3/17/15: OTHER SURGICAL HISTORY      Comment: BTS Cysto- Dr. Manohar Jacobo   3/23/15: OTHER SURGICAL HISTORY      Comment: Cysto, TURBT- Dr. Manohar Jacobo  11/12/15: OTHER SURGICAL HISTORY      Comment: Cysto, Bladder Bx- Dr. Manohar Jacobo   4/19/16: OTHER GLU 98 08/16/2017       HbA1c (%)   Date Value   08/11/2015 5.7 (H)   ----------      Assessment:  Stoma location: RLQ  Stoma type: ileal conduit  Stoma color: red  Stoma condition: normal with stents in place  Stoma diameter:   Ostomy output: urine bloo

## 2017-08-16 NOTE — PROGRESS NOTES
PT RESTING IN BED, EASY NON LABORED BREATHING ON RA. VS WNL. UP WITH SB ASSIST. PT ALERT AND ORIENT TO PERSON,PLACE AND TIME. CAN BE FORGETFUL AT TIMES, EASY TO REORIENT. PT TOLERATING CLEAR LIQUIDS.  MIDLINE INCISION AND LAP SITES X2 GRABIEL, NO DRAINAGE NOTED

## 2017-08-16 NOTE — PLAN OF CARE
Minimal or absence of nausea and vomiting Progressing      Maintains or returns to baseline bowel function Progressing      Achieve highest/safest level of mobility/gait Progressing      Verbalizes/displays adequate comfort level or patient's stated pain g

## 2017-08-16 NOTE — PHYSICAL THERAPY NOTE
PHYSICAL THERAPY TREATMENT NOTE - INPATIENT    Room Number: 633/872-C     Session: 1   Number of Visits to Meet Established Goals: 3    Presenting Problem: Bladder CA s/p procedure 8/14/17 and HL     History related to current admission: Pt was admitted f Comment: Procedure: CYSTOSCOPY TRANSURETHRAL RESECTION                OF BLADDER TUMOR;  Surgeon: Gordan Cockayne, MD;  Location: [address] [place] [address]  2003: OTHER SURGICAL HISTORY      Comment: seeds    3/17/15: OTHER SURGICAL HISTORY Static Sitting: Good  Dynamic Sitting: Good           Static Standing: Fair  Dynamic Standing: Fair -    ACTIVITY TOLERANCE  Room air  No shortness of breath    AM-PAC '6-Clicks' INPATIENT SHORT FORM - BASIC MOBILITY  How much diffic and BLE strengthening, due to [facility] admission for Bladder CA s/p procedure 8/14/17 and HL.     At this time, Pt. presents with decreased balance, impaired strength, difficulty with gait/transfers resulting in downgrade of overall functional mobili

## 2017-08-16 NOTE — PROGRESS NOTES
[facility] Hospitalist note    PCP: Carmel Paiz MD    Chief Complaint:  S/p cystoprostatectomy, b/l lnd/ileal conduit diversion/pelvic mass excision    SUBJECTIVE:  Pt noted to be hallucinating earlier today. Wife concerned, feels frustrated.  Pt reports see 20 mg Oral Nightly   • enoxaparin  40 mg Subcutaneous Nightly   • famoTIDine  20 mg Oral BID   • docusate sodium  100 mg Oral BID   • acetaminophen  1,000 mg Oral Q6H     • sodium chloride 100 mL/hr at 08/15/17 0800     magnesium hydroxide, bisacodyl, HYDR

## 2017-08-17 NOTE — PLAN OF CARE
GASTROINTESTINAL - ADULT    • Minimal or absence of nausea and vomiting Progressing    • Maintains or returns to baseline bowel function Progressing        GENITOURINARY - ADULT    • Absence of urinary retention Progressing        PAIN - ADULT    • Veronica Boyer

## 2017-08-17 NOTE — PAYOR COMM NOTE
--------------  CONTINUED STAY REVIEW    Payor: BCBS MEDICARE ADV PPO  Subscriber #:  [id number]  Authorization Number: [id number]    Admit date: 8/14/17  Admit time: 1445    Admitting Physician: Stoney Dakins, MD  Attending Physician:  Stoney Dakins, MD  Pr conduit urinary diversion, excision of large pelvic mass.     -Afebrile, VSS  -Leukocytosis resolved  -Hgb stable  -Serum creat 1.06  -Good UOP  -Pain controlled  -Tolerating clears, had BMs after suppository given     Plan:    Encouraged continued ambulati

## 2017-08-17 NOTE — PLAN OF CARE
GASTROINTESTINAL - ADULT    • Minimal or absence of nausea and vomiting Progressing    • Maintains or returns to baseline bowel function Progressing        GENITOURINARY - ADULT    • Absence of urinary retention Progressing        Impaired Functional Mobil

## 2017-08-17 NOTE — PROGRESS NOTES
[facility]  Urology Progress Note    Zachary Whyte. Patient Status:  Inpatient    1929 MRN [id number]   [facility] 3NW-A Attending Jerri Vega MD   [facility] Day # 3 PCP Aman Howard MD     Subjective:  Zachary Whyte. tomorrow  DC planning - possibly Saturday.       Above discussed with nurse and KAHLIL Ross-TIMI  [facility] Urology  8/17/2017  7:32 AM

## 2017-08-17 NOTE — PROGRESS NOTES
[facility] Hospitalist Progress Note     PCP: Queen Jurgen MD    SUBJECTIVE:  No CP, SOB, or N/V. Pt feeling well. Still having abdominal pains. States enema cleared him out.     OBJECTIVE:  Temp:  [97.9 °F (36.6 °C)-98.7 °F (37.1 °C)] 98.4 °F (36.9 °C)  Pu HYDROmorphone HCl PF, TraMADol HCl       Assessment/Plan:     Active Problems:    Hyperlipidemia    Bladder cancer ([facility] [address]    # s/p cystoprostatectomy with b/l pelvic lnd, pelvic mass excision and ileal conduit diversion  · Per urology  · SCDs, lovenox  · ul

## 2017-08-17 NOTE — PROGRESS NOTES
[facility] Note: Renal dose adjustment for Famotidine (Pepcid)  Bull Powell. has been prescribed Famotidine (Pepcid) 20 mg every 12 hours. Estimated Creatinine Clearance: 49.5 mL/min (based on SCr of 1.06 mg/dL).     His calculated crea

## 2017-08-18 NOTE — PROGRESS NOTES
[facility]    Progress Note    [address]  Patient Status:  Inpatient    1929 MRN [id number]   [facility] 3NW-A Attending Luly Sousa MD   [address] Day # 4 PCP Magui Keating MD       Subjective:   [address] is BILT 0.65 02/27/2017   TP 7.1 02/27/2017   AST 25 02/27/2017   ALT 23 02/27/2017   PTT 32.9 07/25/2017   INR 1.0 07/25/2017   TSH 5.771 (H) 02/27/2017   PSA 0.17 02/27/2017       Xr Abdomen (1 View) (cpt=74000)    Result Date: 8/16/2017  CONCLUSION:  Gas

## 2017-08-18 NOTE — PROGRESS NOTES
[facility] Hospitalist Progress Note     PCP: Rissa Noel MD    SUBJECTIVE:  Pt sitting up in chair. Pain controlled. No cp/sob/n/v/f/c. +belching. Planning on going home today.      NO BM today, had one yesterday    OBJECTIVE:  Temp:  [97.6 °F (36.4 °C)-98 Problems:    Hyperlipidemia    Bladder cancer [facility])    # s/p cystoprostatectomy with b/l pelvic lnd, pelvic mass excision and ileal conduit diversion  · Per urology  · SCDs, lovenox  · ultram   · Stoma teaching     #Post-op ileus  -regular diet per urology

## 2017-08-18 NOTE — CM/SW NOTE
AVS sent via [facility] to [facility], call to verify they have accepted patient and will call to schedule visit.     Keyon Patterson RN,   Phone [phone]  Pager [phone]

## 2017-08-18 NOTE — PROGRESS NOTES
Pt ambulated x3 times this shift with RN. Pt has steady gait. Pt sat up in bedside chair majority of the day today.

## 2017-08-18 NOTE — PAYOR COMM NOTE
--------------  CONTINUED STAY REVIEW    Payor: BCBS MEDICARE ADV PPO  Subscriber #:  [id number]  Authorization Number: [id number]    Admit date: 8/14/17  Admit time: 1445    Admitting Physician: La De La Garza MD  Attending Physician:  La De La Garza MD  Pr op visit (will check labs at that visit)              Results:      Lab Results  Component Value Date   WBC 18.1 (H) 08/18/2017   HGB 10.7 (L) 08/18/2017   HCT 32.0 (L) 08/18/2017   .0 08/18/2017   CREATSERUM 1.14 08/18/2017   BUN 24 (H) 08/18/2017

## 2017-08-18 NOTE — PHYSICAL THERAPY NOTE
PHYSICAL THERAPY TREATMENT NOTE - INPATIENT    Room Number: 575/760-X     Session: 2   Number of Visits to Meet Established Goals: 3    Presenting Problem: Bladder CA s/p procedure 8/14/17 and HL     History related to current admission: Pt was admitted f CYSTOURETHROSCOPY,FULGUR 2-5CM DANIE N/A      Comment: Procedure: CYSTOSCOPY TRANSURETHRAL RESECTION                OF BLADDER TUMOR;  Surgeon: Pinky Shi MD;  Location: [address] [place] [address]  2003: OTHER SURGICAL HISTORY      Commen Static Sitting: Good  Dynamic Sitting: Good           Static Standing: Fair -  Dynamic Standing: Fair -    ACTIVITY TOLERANCE  Room air  No s Extremity Alternating marching  Ankle pumps  LAQ     Upper Extremity      Position Sitting     Repetitions   15-20   Sets   1     Patient End of Session: Up in chair;Needs met;Call light within reach;RN aware of session/findings; All patient questions and c

## 2017-08-21 NOTE — PAYOR COMM NOTE
--------------  DISCHARGE REVIEW    Payor: BCBS MEDICARE ADV PPO  Subscriber #:  [id number]  Authorization Number: [id number]    Admit date: 8/14/17  Admit time:  2411  Discharge Date: 8/18/2017  4:08 PM     Admitting Physician: Warren Fernandez MD    [employer] CREATSERUM  1.06  1.14   CA  8.5  8.5   GLU  98  127*            Meds:      • famoTIDine  20 mg Oral Daily   • atorvastatin  20 mg Oral Nightly   • enoxaparin  40 mg Subcutaneous Nightly   • docusate sodium  100 mg Oral BID         bisacodyl, ondansetron

## 2017-08-21 NOTE — WOUND PROGRESS NOTE
[facility]  Inpatient Ostomy Progress Note    Dulcie Head.  Patient Status:  Inpatient    1929 MRN [id number]   [facility] 3NW-A Attending No att. providers found   Days in hospital 4 Primary Stephanie Green MD     History have home health. Total time spent with patient:  1 Hour 30 Minutes   (late entry note)        Thank you for allowing me to participate in the care of your patient.     Gabe Holloway RN, BSN, [facility] nurse  Pager [phone]

## 2017-08-24 NOTE — DISCHARGE SUMMARY
[facility]  Discharge Summary    Shaun Barriga.  Patient Status:  Inpatient    1929 MRN [id number]   [facility] 3NW-A Attending No att. providers found   Hosp Day # 4 PCP Daya Hager MD     Date of Admission: 2017 patient was brought to the OR for cystoscopy, robotic radical cystoprostatectomy, bilateral pelvic lymph node dissection, ileal conduit urinary diversion, excision of large pelvic mass, which was completed without any intraoperative complication.   The aristeo up Visits: Follow-up with Reta Ramirez next week and Dr. Justyna Yanes on 9/5/17. Other Discharge Instructions: No heavy lifting/strenuous activity. Lovenox injections daily x 2 weeks.       Bee Prado P.A.-C  [facility] Urology  8/24/2017  2:23 PM

## 2017-08-27 NOTE — OPERATIVE REPORT
[facility]    Patients Name: Caro Perry.   Attending Physician: Justine att. providers found  CSN: [id number]    Location:  302/302-A  MRN: [id number]    YOB: 1929  Admission Date: 8/14/2017     Patient Name: Caro Perry.    and draped in sterile fashion. The patient had an  incision above the level of the umbilicus. We placed a Veress needle within the  abdomen and safely insufflated to 15 mmHg.   We then placed three 8 mm robotic  trocars, 12 mm assistant trocar, and a 5 mm left and  right sides, the prostatic pedicles were taken down and the pelvic fascia was  opened up.   The apex was difficult to approach as the rectum was very close,  and therefore, we decided to mobilize the bladder down off the anterior  abdominal wall a our conduit and placed a irrigation tip within it and cannulated our stent and wire through the proximal opening for the uretero-intestinal anastomosis. The left ureter was brought under the sigmoid mesentary to the right pelvis.   We were able to spatulat

## 2017-09-05 PROBLEM — N13.30 BILATERAL HYDRONEPHROSIS: Status: ACTIVE | Noted: 2017-01-01

## 2017-09-05 PROBLEM — C67.8 MALIGNANT NEOPLASM OF OVERLAPPING SITES OF BLADDER (HCC): Status: ACTIVE | Noted: 2017-01-01

## 2017-09-06 PROBLEM — E44.1 MILD PROTEIN-CALORIE MALNUTRITION (HCC): Status: ACTIVE | Noted: 2017-01-01

## 2017-09-06 NOTE — TELEPHONE ENCOUNTER
Called pts wife Charito Easley who was given a months worth of ostomy supplies from the hospital upon discharge, Charito Easley states she is almost out of supplies and has not received any from her home health care nurse.   I had registered pt with [facility]

## 2017-09-12 PROBLEM — C67.9 METASTASIS FROM BLADDER CANCER (HCC): Status: ACTIVE | Noted: 2017-01-01

## 2017-09-12 PROBLEM — C79.9 METASTASIS FROM BLADDER CANCER (HCC): Status: ACTIVE | Noted: 2017-01-01

## 2017-09-12 PROBLEM — E44.0 MODERATE PROTEIN-CALORIE MALNUTRITION (HCC): Status: ACTIVE | Noted: 2017-01-01

## 2017-09-12 PROBLEM — Z66 DNR (DO NOT RESUSCITATE): Status: ACTIVE | Noted: 2017-01-01

## 2017-09-14 PROBLEM — Z93.2 ILEOSTOMY IN PLACE (HCC): Status: ACTIVE | Noted: 2017-01-01

## 2017-10-03 PROBLEM — K40.90 LEFT INGUINAL HERNIA: Status: ACTIVE | Noted: 2017-01-01
